# Patient Record
Sex: MALE | Race: WHITE | NOT HISPANIC OR LATINO | Employment: FULL TIME | URBAN - METROPOLITAN AREA
[De-identification: names, ages, dates, MRNs, and addresses within clinical notes are randomized per-mention and may not be internally consistent; named-entity substitution may affect disease eponyms.]

---

## 2020-12-29 ENCOUNTER — OFFICE VISIT (OUTPATIENT)
Dept: URGENT CARE | Facility: PHYSICIAN GROUP | Age: 30
End: 2020-12-29
Payer: COMMERCIAL

## 2020-12-29 VITALS
HEART RATE: 75 BPM | HEIGHT: 68 IN | DIASTOLIC BLOOD PRESSURE: 74 MMHG | OXYGEN SATURATION: 100 % | RESPIRATION RATE: 12 BRPM | BODY MASS INDEX: 25.61 KG/M2 | SYSTOLIC BLOOD PRESSURE: 116 MMHG | WEIGHT: 169 LBS | TEMPERATURE: 97.7 F

## 2020-12-29 DIAGNOSIS — J02.9 PHARYNGITIS, UNSPECIFIED ETIOLOGY: ICD-10-CM

## 2020-12-29 LAB
INT CON NEG: NORMAL
INT CON POS: NORMAL
S PYO AG THROAT QL: NEGATIVE

## 2020-12-29 PROCEDURE — 87880 STREP A ASSAY W/OPTIC: CPT | Performed by: PHYSICIAN ASSISTANT

## 2020-12-29 PROCEDURE — 99203 OFFICE O/P NEW LOW 30 MIN: CPT | Performed by: PHYSICIAN ASSISTANT

## 2020-12-29 ASSESSMENT — ENCOUNTER SYMPTOMS
TROUBLE SWALLOWING: 0
SORE THROAT: 1
COUGH: 0
FEVER: 0
HEADACHES: 0
PALPITATIONS: 0
SHORTNESS OF BREATH: 0
MYALGIAS: 0
SWOLLEN GLANDS: 0
VOMITING: 0
ABDOMINAL PAIN: 0
DIARRHEA: 0
NAUSEA: 0
CHILLS: 0
WHEEZING: 0

## 2020-12-30 NOTE — PROGRESS NOTES
"Subjective:      Vladimir Castellanos is a 30 y.o. male who presents with Sore Throat (soreness, tender, painful swallowing, x2 days )            Pharyngitis   This is a new problem. Episode onset: 2 days. The problem has been gradually improving. Neither side of throat is experiencing more pain than the other. There has been no fever. The pain is mild. Pertinent negatives include no abdominal pain, congestion, coughing, diarrhea, ear pain, headaches, plugged ear sensation, shortness of breath, swollen glands, trouble swallowing or vomiting. He has tried NSAIDs for the symptoms. The treatment provided moderate relief.     The patient states his symptoms started shortly after taking vitamins. He reports he did have a sudden pain right after swallowing. The pain resolved but then came back later.     History reviewed. No pertinent past medical history.    History reviewed. No pertinent surgical history.    .History reviewed. No pertinent family history.    No Known Allergies    Medications, Allergies, and current problem list reviewed today in Epic      Review of Systems   Constitutional: Negative for chills, fever and malaise/fatigue.   HENT: Positive for sore throat. Negative for congestion, ear pain and trouble swallowing.    Respiratory: Negative for cough, shortness of breath and wheezing.    Cardiovascular: Negative for chest pain, palpitations and leg swelling.   Gastrointestinal: Negative for abdominal pain, diarrhea, nausea and vomiting.   Musculoskeletal: Negative for myalgias.   Skin: Negative for rash.   Neurological: Negative for headaches.     All other systems reviewed and are negative.        Objective:     /74 (BP Location: Left arm, Patient Position: Sitting, BP Cuff Size: Adult)   Pulse 75   Temp 36.5 °C (97.7 °F) (Temporal)   Resp 12   Ht 1.727 m (5' 8\")   Wt 76.7 kg (169 lb)   SpO2 100%   BMI 25.70 kg/m²      Physical Exam  Constitutional:       General: He is not in acute distress.     " Appearance: He is not ill-appearing.   HENT:      Head: Normocephalic and atraumatic.      Mouth/Throat:      Mouth: Mucous membranes are moist.      Pharynx: Posterior oropharyngeal erythema (mild) present.   Eyes:      Conjunctiva/sclera: Conjunctivae normal.   Cardiovascular:      Rate and Rhythm: Normal rate.   Pulmonary:      Effort: Pulmonary effort is normal. No respiratory distress.      Breath sounds: No stridor. No wheezing.   Musculoskeletal: Normal range of motion.   Lymphadenopathy:      Cervical: No cervical adenopathy.   Skin:     General: Skin is warm and dry.      Findings: No rash.   Neurological:      General: No focal deficit present.      Mental Status: He is alert.   Psychiatric:         Mood and Affect: Mood normal.         Behavior: Behavior normal.         Thought Content: Thought content normal.         Judgment: Judgment normal.                 Assessment/Plan:        1. Pharyngitis, unspecified etiology    - POCT Rapid Strep A- negative    Suspect pill esophagitis.   Encouraged soft foods  OTC NSAID  RTC if any other symptoms develop.    Differential diagnoses, Supportive care, and indications for immediate follow-up discussed with patient.   Pathogenesis of diagnosis discussed including typical length and natural progression.   Instructed to return to clinic or nearest emergency department for any change in condition, further concerns, or worsening of symptoms.    The patient demonstrated a good understanding and agreed with the treatment plan.    Libertad Flores P.A.-C.

## 2022-01-27 ENCOUNTER — APPOINTMENT (OUTPATIENT)
Dept: RADIOLOGY | Facility: MEDICAL CENTER | Age: 32
End: 2022-01-27
Attending: CHIROPRACTOR
Payer: COMMERCIAL

## 2022-01-27 DIAGNOSIS — M54.31 RIGHT SIDED SCIATICA: ICD-10-CM

## 2022-01-27 DIAGNOSIS — M51.16 NEURITIS OR RADICULITIS DUE TO RUPTURE OF LUMBAR INTERVERTEBRAL DISC: ICD-10-CM

## 2022-01-27 PROCEDURE — 72148 MRI LUMBAR SPINE W/O DYE: CPT

## 2022-11-26 ENCOUNTER — OFFICE VISIT (OUTPATIENT)
Dept: URGENT CARE | Facility: PHYSICIAN GROUP | Age: 32
End: 2022-11-26
Payer: COMMERCIAL

## 2022-11-26 VITALS
HEIGHT: 68 IN | BODY MASS INDEX: 26.98 KG/M2 | OXYGEN SATURATION: 97 % | WEIGHT: 178 LBS | RESPIRATION RATE: 14 BRPM | DIASTOLIC BLOOD PRESSURE: 72 MMHG | SYSTOLIC BLOOD PRESSURE: 120 MMHG | TEMPERATURE: 97.2 F | HEART RATE: 71 BPM

## 2022-11-26 DIAGNOSIS — J02.0 PHARYNGITIS DUE TO STREPTOCOCCUS SPECIES: ICD-10-CM

## 2022-11-26 LAB
INT CON NEG: NEGATIVE
INT CON POS: POSITIVE
S PYO AG THROAT QL: POSITIVE

## 2022-11-26 PROCEDURE — 99213 OFFICE O/P EST LOW 20 MIN: CPT | Performed by: PHYSICIAN ASSISTANT

## 2022-11-26 PROCEDURE — 87880 STREP A ASSAY W/OPTIC: CPT | Performed by: PHYSICIAN ASSISTANT

## 2022-11-26 RX ORDER — AMOXICILLIN 500 MG/1
500 CAPSULE ORAL 2 TIMES DAILY
Qty: 20 CAPSULE | Refills: 0 | Status: SHIPPED | OUTPATIENT
Start: 2022-11-26 | End: 2022-12-06

## 2022-11-26 ASSESSMENT — ENCOUNTER SYMPTOMS
CHILLS: 0
FEVER: 0
VOMITING: 0
DIARRHEA: 0
DIZZINESS: 0
COUGH: 0
ABDOMINAL PAIN: 0
SWOLLEN GLANDS: 1
NAUSEA: 0
SHORTNESS OF BREATH: 0
EYE PAIN: 0
BLURRED VISION: 0
HEADACHES: 1
TROUBLE SWALLOWING: 0
PALPITATIONS: 0
SINUS PAIN: 0
HOARSE VOICE: 1
MYALGIAS: 0
SORE THROAT: 1

## 2022-11-27 NOTE — PROGRESS NOTES
Subjective     Vladimir Castellanos is a 32 y.o. male who presents with Sore Throat (Slight headache. )      Pharyngitis   This is a new problem. The current episode started in the past 7 days (started 2 days ago). The problem has been gradually worsening. Sore throat worse side: Was initially worse on the right but now hurts everywhere. There has been no fever. The pain is mild. Associated symptoms include headaches, a hoarse voice and swollen glands. Pertinent negatives include no abdominal pain, congestion, coughing, diarrhea, ear discharge, ear pain, plugged ear sensation, shortness of breath, trouble swallowing or vomiting. He has had no exposure to strep or mono. Treatments tried: He has been drinking cool liquids which provides moderate relief.     Review of Systems   Constitutional:  Negative for chills, fever and malaise/fatigue.   HENT:  Positive for hoarse voice and sore throat. Negative for congestion, ear discharge, ear pain, sinus pain and trouble swallowing.    Eyes:  Negative for blurred vision and pain.   Respiratory:  Negative for cough and shortness of breath.    Cardiovascular:  Negative for chest pain and palpitations.   Gastrointestinal:  Negative for abdominal pain, diarrhea, nausea and vomiting.   Musculoskeletal:  Negative for myalgias.   Skin:  Negative for rash.   Neurological:  Positive for headaches. Negative for dizziness.         PMH:  has no past medical history on file.  MEDS:   Current Outpatient Medications:     minocycline (MINOCIN) 100 MG CAPS, Take 100 mg by mouth 2 times a day., Disp: , Rfl:   ALLERGIES: No Known Allergies  SURGHX: No past surgical history on file.  SOCHX:  reports that he has never smoked. He has never used smokeless tobacco. He reports that he does not drink alcohol and does not use drugs.  FH: Family history was reviewed, no pertinent findings to report        Objective     /72 (BP Location: Left arm, Patient Position: Sitting, BP Cuff Size: Adult)    "Pulse 71   Temp 36.2 °C (97.2 °F) (Temporal)   Resp 14   Ht 1.727 m (5' 8\")   Wt 80.7 kg (178 lb)   SpO2 97%   BMI 27.06 kg/m²      Physical Exam  Constitutional:       Appearance: He is well-developed.   HENT:      Head: Normocephalic and atraumatic.      Right Ear: Tympanic membrane, ear canal and external ear normal.      Left Ear: Tympanic membrane, ear canal and external ear normal.      Nose: Mucosal edema present. No congestion or rhinorrhea.      Mouth/Throat:      Lips: Pink.      Mouth: Mucous membranes are moist.      Pharynx: Uvula midline. Posterior oropharyngeal erythema present. No oropharyngeal exudate or uvula swelling.   Eyes:      Conjunctiva/sclera: Conjunctivae normal.      Pupils: Pupils are equal, round, and reactive to light.   Cardiovascular:      Rate and Rhythm: Normal rate and regular rhythm.      Heart sounds: Normal heart sounds. No murmur heard.  Pulmonary:      Effort: Pulmonary effort is normal.      Breath sounds: Normal breath sounds. No wheezing.   Musculoskeletal:      Cervical back: Normal range of motion.   Lymphadenopathy:      Cervical: No cervical adenopathy.   Skin:     General: Skin is warm and dry.      Capillary Refill: Capillary refill takes less than 2 seconds.   Neurological:      Mental Status: He is alert and oriented to person, place, and time.   Psychiatric:         Behavior: Behavior normal.         Judgment: Judgment normal.           POCT Rapid Strep A - POSITIVE    Assessment & Plan       1. Pharyngitis due to Streptococcus species  - POCT Rapid Strep A  - amoxicillin (AMOXIL) 500 MG Cap; Take 1 Capsule by mouth 2 times a day for 10 days.  Dispense: 20 Capsule; Refill:   -Supportive care discussed to include salt water gargles, throat lozenges, and increased fluid intake  - Tylenol or ibuprofen as needed for fever > 100.4 F  Discussed with patient that they are contagious until they been on the antibiotics for at least 24 hours.  Also recommend that they " switch their toothbrush out after being on the antibiotics for 2 to 3 days.                Differential Diagnosis, natural history, and supportive care discussed. Return to the Urgent Care or follow up with your PCP if symptoms fail to resolve, or for any new or worsening symptoms. Emergency room precautions discussed. Patient and/or family appears understanding of information.